# Patient Record
Sex: FEMALE
[De-identification: names, ages, dates, MRNs, and addresses within clinical notes are randomized per-mention and may not be internally consistent; named-entity substitution may affect disease eponyms.]

---

## 2018-09-24 ENCOUNTER — HOSPITAL ENCOUNTER (OUTPATIENT)
Dept: HOSPITAL 88 - MAMMO | Age: 83
End: 2018-09-24
Attending: INTERNAL MEDICINE
Payer: MEDICARE

## 2018-09-24 DIAGNOSIS — Z12.31: Primary | ICD-10-CM

## 2018-09-24 PROCEDURE — 77067 SCR MAMMO BI INCL CAD: CPT

## 2019-10-11 ENCOUNTER — HOSPITAL ENCOUNTER (OUTPATIENT)
Dept: HOSPITAL 88 - MAMMO | Age: 84
End: 2019-10-11
Attending: INTERNAL MEDICINE
Payer: MEDICARE

## 2019-10-11 DIAGNOSIS — M89.9: ICD-10-CM

## 2019-10-11 DIAGNOSIS — Z12.31: Primary | ICD-10-CM

## 2019-10-11 PROCEDURE — 77067 SCR MAMMO BI INCL CAD: CPT

## 2019-10-11 PROCEDURE — 77080 DXA BONE DENSITY AXIAL: CPT

## 2019-10-14 NOTE — DIAGNOSTIC IMAGING REPORT
Exam:  Bone mineral density study.



History:  84-year-old female



Comparison:  10/7/2016



Discussion: 

Evaluation of the left hip and lumbar spine was performed utilizing DEXA

Hologic bone densitometer. 

The study is technically adequate.

The patient's fracture risk is compared to an age-matched control.



Left femoral neck bone mineral density:  0.566 g/cm2, T-score is -2.6, Z-score

is -0.1. Decreased 3.6% compared to prior exam.



Lumbar spine total bone mineral density:  0.969 gm/cm2, T-score is -0.7,

Z-score is 2.1. Increased 0.5% compared to prior exam. 



Impression:

Bone mineralization by WHO Classification using T score is osteoporosis,

fracture risk is high. 



<T score:

NL = -1 or higher

Osteopenia = -1 to -2.5

Osteoporosis = -2.5 or lower



Z score:

< - 1.5 concerning for path>



Recommendations:

Medical evaluation for secondary causes of low bone mineral density may be

appropriate.



Correlate clinically for the necessity and timing of the next bone mineral

density study.



[employer] recommendations: 

Initiate therapy to reduce fracture risk in postmenopausal women with 

-BMD t-scores below -2 by central DXA with no risk factors

-BMD t-scores below -1.5 by central DXA with one or more risk factors (first

deg relative with hip fracture, prior personal fracture, low body weight,

smoking)

-A prior vertebral or hip fracture



[facility] (Clinical Endocrinology) recommends treating the following: 

Postmenopausal women who have osteoporosis as diagnosed by fragility fractures

or t scores -2.5 or below

Postmenopausal women who have risk factors (including fh of hip fracture, low

body weight, smoking, risk of falling, high bone turnover, advancing age) and

borderline low BMD

T scores of -1.5 or below 

Adequate intake of calcium (at least 1200mg/day) and vitamin D (400-800

IU/day). 

Regular weight bearing and muscle - strengthening exercises

Avoid smoking and excessive alcohol



Signed by: Coy Cash on 10/14/2019 10:18 AM

## 2020-10-12 ENCOUNTER — HOSPITAL ENCOUNTER (OUTPATIENT)
Dept: HOSPITAL 88 - MAMMO | Age: 85
End: 2020-10-12
Attending: INTERNAL MEDICINE
Payer: MEDICARE

## 2020-10-12 DIAGNOSIS — Z12.31: Primary | ICD-10-CM

## 2020-10-12 PROCEDURE — 77067 SCR MAMMO BI INCL CAD: CPT

## 2021-10-19 ENCOUNTER — HOSPITAL ENCOUNTER (OUTPATIENT)
Dept: HOSPITAL 88 - MAMMO | Age: 86
End: 2021-10-19
Attending: INTERNAL MEDICINE
Payer: MEDICARE

## 2021-10-19 DIAGNOSIS — Z12.31: Primary | ICD-10-CM

## 2021-10-19 PROCEDURE — 77067 SCR MAMMO BI INCL CAD: CPT

## 2022-10-24 ENCOUNTER — HOSPITAL ENCOUNTER (OUTPATIENT)
Dept: HOSPITAL 88 - MAMMO | Age: 87
End: 2022-10-24
Attending: INTERNAL MEDICINE
Payer: MEDICARE

## 2022-10-24 DIAGNOSIS — Z12.31: Primary | ICD-10-CM

## 2022-10-24 DIAGNOSIS — M85.88: ICD-10-CM

## 2022-10-24 PROCEDURE — 77067 SCR MAMMO BI INCL CAD: CPT

## 2022-10-24 PROCEDURE — 77080 DXA BONE DENSITY AXIAL: CPT

## 2025-03-14 ENCOUNTER — HOSPITAL ENCOUNTER (OUTPATIENT)
Dept: HOSPITAL 88 - DX | Age: OVER 89
End: 2025-03-14
Attending: INTERNAL MEDICINE
Payer: MEDICARE

## 2025-03-14 DIAGNOSIS — M81.0: Primary | ICD-10-CM

## 2025-03-14 PROCEDURE — 77080 DXA BONE DENSITY AXIAL: CPT
